# Patient Record
Sex: MALE | Race: WHITE | Employment: FULL TIME | URBAN - METROPOLITAN AREA
[De-identification: names, ages, dates, MRNs, and addresses within clinical notes are randomized per-mention and may not be internally consistent; named-entity substitution may affect disease eponyms.]

---

## 2023-01-25 ENCOUNTER — HOSPITAL ENCOUNTER (EMERGENCY)
Age: 23
Discharge: HOME | End: 2023-01-25
Attending: EMERGENCY MEDICINE
Payer: MEDICAID

## 2023-01-25 ENCOUNTER — APPOINTMENT (OUTPATIENT)
Dept: GENERAL RADIOLOGY | Age: 23
End: 2023-01-25
Payer: MEDICAID

## 2023-01-25 VITALS
WEIGHT: 140 LBS | OXYGEN SATURATION: 99 % | DIASTOLIC BLOOD PRESSURE: 69 MMHG | TEMPERATURE: 98.1 F | RESPIRATION RATE: 12 BRPM | HEIGHT: 67 IN | BODY MASS INDEX: 21.97 KG/M2 | HEART RATE: 81 BPM | SYSTOLIC BLOOD PRESSURE: 108 MMHG

## 2023-01-25 DIAGNOSIS — M62.838 SPASM OF MUSCLE: Primary | ICD-10-CM

## 2023-01-25 LAB
ABSOLUTE EOS #: 0.22 K/UL (ref 0–0.4)
ABSOLUTE LYMPH #: 2.58 K/UL (ref 1.2–5.2)
ABSOLUTE MONO #: 0.9 K/UL (ref 0.1–1.3)
ALBUMIN SERPL-MCNC: 5.2 G/DL (ref 3.5–5.2)
ALP BLD-CCNC: 92 U/L (ref 40–129)
ALT SERPL-CCNC: 16 U/L (ref 5–41)
ANION GAP SERPL CALCULATED.3IONS-SCNC: 9 MMOL/L (ref 9–17)
AST SERPL-CCNC: 17 U/L
BASOPHILS # BLD: 1 % (ref 0–2)
BASOPHILS ABSOLUTE: 0.11 K/UL (ref 0–0.2)
BILIRUB SERPL-MCNC: 0.7 MG/DL (ref 0.3–1.2)
BUN BLDV-MCNC: 17 MG/DL (ref 6–20)
CALCIUM SERPL-MCNC: 9.4 MG/DL (ref 8.6–10.4)
CHLORIDE BLD-SCNC: 100 MMOL/L (ref 98–107)
CO2: 28 MMOL/L (ref 20–31)
CREAT SERPL-MCNC: 1.03 MG/DL (ref 0.7–1.2)
EOSINOPHILS RELATIVE PERCENT: 2 % (ref 0–4)
GFR SERPL CREATININE-BSD FRML MDRD: >60 ML/MIN/1.73M2
GLUCOSE BLD-MCNC: 84 MG/DL (ref 70–99)
HCT VFR BLD CALC: 42.9 % (ref 41–53)
HEMOGLOBIN: 15.5 G/DL (ref 13.5–17.5)
LIPASE: 17 U/L (ref 13–60)
LYMPHOCYTES # BLD: 23 % (ref 25–45)
MCH RBC QN AUTO: 30.3 PG (ref 26–34)
MCHC RBC AUTO-ENTMCNC: 36.1 G/DL (ref 31–37)
MCV RBC AUTO: 84.1 FL (ref 80–100)
MONOCYTES # BLD: 8 % (ref 2–8)
MORPHOLOGY: ABNORMAL
PDW BLD-RTO: 12.5 % (ref 11.5–14.9)
PLATELET # BLD: 199 K/UL (ref 150–450)
PMV BLD AUTO: 7.9 FL (ref 6–12)
POTASSIUM SERPL-SCNC: 3.7 MMOL/L (ref 3.7–5.3)
RBC # BLD: 5.1 M/UL (ref 4.5–5.9)
SEG NEUTROPHILS: 66 % (ref 34–64)
SEGMENTED NEUTROPHILS ABSOLUTE COUNT: 7.39 K/UL (ref 1.3–9.1)
SODIUM BLD-SCNC: 137 MMOL/L (ref 135–144)
TOTAL PROTEIN: 7.7 G/DL (ref 6.4–8.3)
TROPONIN, HIGH SENSITIVITY: 8 NG/L (ref 0–22)
TROPONIN, HIGH SENSITIVITY: 8 NG/L (ref 0–22)
WBC # BLD: 11.2 K/UL (ref 4.5–13.5)

## 2023-01-25 PROCEDURE — 36415 COLL VENOUS BLD VENIPUNCTURE: CPT

## 2023-01-25 PROCEDURE — 84484 ASSAY OF TROPONIN QUANT: CPT

## 2023-01-25 PROCEDURE — 93005 ELECTROCARDIOGRAM TRACING: CPT | Performed by: STUDENT IN AN ORGANIZED HEALTH CARE EDUCATION/TRAINING PROGRAM

## 2023-01-25 PROCEDURE — 6360000002 HC RX W HCPCS: Performed by: STUDENT IN AN ORGANIZED HEALTH CARE EDUCATION/TRAINING PROGRAM

## 2023-01-25 PROCEDURE — 83690 ASSAY OF LIPASE: CPT

## 2023-01-25 PROCEDURE — 71045 X-RAY EXAM CHEST 1 VIEW: CPT

## 2023-01-25 PROCEDURE — 96372 THER/PROPH/DIAG INJ SC/IM: CPT

## 2023-01-25 PROCEDURE — 2580000003 HC RX 258: Performed by: STUDENT IN AN ORGANIZED HEALTH CARE EDUCATION/TRAINING PROGRAM

## 2023-01-25 PROCEDURE — 90715 TDAP VACCINE 7 YRS/> IM: CPT

## 2023-01-25 PROCEDURE — 85025 COMPLETE CBC W/AUTO DIFF WBC: CPT

## 2023-01-25 PROCEDURE — 6370000000 HC RX 637 (ALT 250 FOR IP)

## 2023-01-25 PROCEDURE — 90471 IMMUNIZATION ADMIN: CPT

## 2023-01-25 PROCEDURE — 6360000002 HC RX W HCPCS

## 2023-01-25 PROCEDURE — 80053 COMPREHEN METABOLIC PANEL: CPT

## 2023-01-25 PROCEDURE — 99285 EMERGENCY DEPT VISIT HI MDM: CPT

## 2023-01-25 RX ORDER — DOXYCYCLINE HYCLATE 100 MG
100 TABLET ORAL 2 TIMES DAILY
Qty: 14 TABLET | Refills: 0 | Status: SHIPPED | OUTPATIENT
Start: 2023-01-25 | End: 2023-02-01

## 2023-01-25 RX ORDER — DOXYCYCLINE 100 MG/1
100 CAPSULE ORAL ONCE
Status: COMPLETED | OUTPATIENT
Start: 2023-01-25 | End: 2023-01-25

## 2023-01-25 RX ORDER — 0.9 % SODIUM CHLORIDE 0.9 %
1000 INTRAVENOUS SOLUTION INTRAVENOUS ONCE
Status: COMPLETED | OUTPATIENT
Start: 2023-01-25 | End: 2023-01-25

## 2023-01-25 RX ORDER — DOXYCYCLINE 100 MG/1
100 CAPSULE ORAL ONCE
Status: DISCONTINUED | OUTPATIENT
Start: 2023-01-25 | End: 2023-01-25

## 2023-01-25 RX ORDER — DOXYCYCLINE 100 MG/1
CAPSULE ORAL
Status: COMPLETED
Start: 2023-01-25 | End: 2023-01-25

## 2023-01-25 RX ADMIN — TETANUS IMMUNE GLOBULIN (HUMAN) 250 UNITS: 250 INJECTION INTRAMUSCULAR at 18:34

## 2023-01-25 RX ADMIN — DOXYCYCLINE 100 MG: 100 CAPSULE ORAL at 17:43

## 2023-01-25 RX ADMIN — TETANUS IMMUNE GLOBULIN (HUMAN) 250 UNITS: 250 INJECTION INTRAMUSCULAR at 16:19

## 2023-01-25 RX ADMIN — TETANUS TOXOID, REDUCED DIPHTHERIA TOXOID AND ACELLULAR PERTUSSIS VACCINE, ADSORBED 0.5 ML: 5; 2.5; 8; 8; 2.5 SUSPENSION INTRAMUSCULAR at 16:25

## 2023-01-25 RX ADMIN — SODIUM CHLORIDE 1000 ML: 9 INJECTION, SOLUTION INTRAVENOUS at 16:15

## 2023-01-25 ASSESSMENT — HEART SCORE: ECG: 0

## 2023-01-25 ASSESSMENT — LIFESTYLE VARIABLES
HOW OFTEN DO YOU HAVE A DRINK CONTAINING ALCOHOL: MONTHLY OR LESS
HOW MANY STANDARD DRINKS CONTAINING ALCOHOL DO YOU HAVE ON A TYPICAL DAY: 1 OR 2

## 2023-01-25 ASSESSMENT — ENCOUNTER SYMPTOMS
VOMITING: 0
BACK PAIN: 0
DIARRHEA: 0
ABDOMINAL PAIN: 0
CONSTIPATION: 0
SHORTNESS OF BREATH: 1
NAUSEA: 0

## 2023-01-25 ASSESSMENT — PAIN - FUNCTIONAL ASSESSMENT
PAIN_FUNCTIONAL_ASSESSMENT: 0-10
PAIN_FUNCTIONAL_ASSESSMENT: NONE - DENIES PAIN

## 2023-01-25 ASSESSMENT — PAIN SCALES - GENERAL: PAINLEVEL_OUTOF10: 1

## 2023-01-25 NOTE — ED PROVIDER NOTES
[address]     Pt Name: Blair Pallas  MRN: [id number]  [place] 2000  Date of evaluation: 1/25/23       Blair Pallas is a 23 y.o. male who presents with Facial Pain (Pt to ED for right sided jaw pain- concerned for tetanus exposure/Pt states he was scratched by a \"loki nail\" to left upper ABD wall about 1 week ago/Pt conversing without difficulty, RR even and unlabored and handling secretions. )      MDM: 74-year-old male presents for reported facial pain, muscle spasms and reported exposure to a loki nail. He states that he was scraped by a loki nail at work approximately 8 days ago he states in the last day or so he felt like he had been having muscle spasms. He is states that he has not had a tetanus shot since he was a little    On initial exam patient in no acute distress, vitals are stable, on exam he has no trismus, no muscle rigidity noted, he is maintaining airway, no difficulty swallowing    Will check basic labs provide treatment    Patient was reevaluated reports feeling better    Patient/Guardian was informed of their diagnosis and told to follow up with PCP  in 1-3 days. Patient demonstrates understanding and agreement with the plan. They were given the opportunity to ask questions and those questions were answered to the best of our ability with the available information. Patient/Guardian told to return to the ED for any new, worsening, changing or persistent symptoms. This dictation was prepared using [email or web address] voice recognition software. Vitals:   Vitals:    01/25/23 1517   BP: 104/68   Pulse: 82   Resp: 16   Temp: 98.4 °F (36.9 °C)   TempSrc: Oral   SpO2: 99%   Weight: 140 lb (63.5 kg)   Height: 5' 7\" (1.702 m)         I personally saw and examined the patient. I have reviewed and agree with the resident's findings, including all diagnostic interpretations and treatment plan as written.  I was present for the key portions of any procedures performed and the inclusive time noted for any critical care statement. The care is provided during an unprecedented national emergency due to the novel coronavirus, COVID 19.   Dalton Meyers DO  Attending Emergency Physician           Dalton Meyers DO  01/26/23 1200

## 2023-01-25 NOTE — ED PROVIDER NOTES
[address] [facility]  Emergency Department Encounter  EmergencyMedicine Resident     Pt Name:Shabbir Parikh  MRN: [id number]  [place] 2000  Date of evaluation: 1/25/23  PCP:  No primary care provider on file. This patient was evaluated in the Emergency Department for symptoms described in the history of present illness. CHIEF COMPLAINT       Chief Complaint   Patient presents with    Facial Pain     Pt to ED for right sided jaw pain- concerned for tetanus exposure  Pt states he was scratched by a \"loki nail\" to left upper ABD wall about 1 week ago  Pt conversing without difficulty, RR even and unlabored and handling secretions. HISTORY OF PRESENT ILLNESS  (Location/Symptom, Timing/Onset, Context/Setting, Quality, Duration, Modifying Factors, Severity.)      Melvin Adkins is a 23 y.o. male who presents with abdominal pain, abdominal muscle spasms, left lower extremity muscle spasms, neck pain, jaw pain, headaches, nausea/vomiting. Patient states that he has been experiencing these symptoms for the past 8 days. States that 8 days ago he was in a crawl space for work when he had a scratch on his abdomen from a loki nail. Patient does not remember the last time he was immunized. States the last time he saw a doctor was at the age of 17 and does not remember receiving any shots at that time. States that he has a history of migraines and his headaches feel like his normal migraines. Denies migraine at this time. Neck pain is posterior but patient has full range of motion in neck without discomfort. Patient endorsing jaw pain but is able to open his jaw completely. No change in voice, no difficulty breathing, no difficulty swallowing. Able to tolerate p.o. but patient is endorsing nausea and vomiting. Patient also endorsing muscle spasms across his abdomen and left lower extremity. Wound is on the left abdomen. Denies fever/chills.   Patient currently denying all pain and symptoms in the emergency department at this time except for neck discomfort. PAST MEDICAL / SURGICAL / SOCIAL / FAMILY HISTORY      has no past medical history on file. Denies     has no past surgical history on file. Denies    Social History     Socioeconomic History    Marital status: Single     Spouse name: Not on file    Number of children: Not on file    Years of education: Not on file    Highest education level: Not on file   Occupational History    Not on file   Tobacco Use    Smoking status: Not on file    Smokeless tobacco: Not on file   Substance and Sexual Activity    Alcohol use: Not on file    Drug use: Not on file    Sexual activity: Not on file   Other Topics Concern    Not on file   Social History Narrative    Not on file     Social Determinants of Health     Financial Resource Strain: Not on file   Food Insecurity: Not on file   Transportation Needs: Not on file   Physical Activity: Not on file   Stress: Not on file   Social Connections: Not on file   Intimate Partner Violence: Not on file   Housing Stability: Not on file       No family history on file. Allergies:    Patient has no known allergies. Home Medications:  Prior to Admission medications    Medication Sig Start Date End Date Taking? Authorizing Provider   doxycycline hyclate (VIBRA-TABS) 100 MG tablet Take 1 tablet by mouth 2 times daily for 7 days 1/25/23 2/1/23 Yes Elder Berry, DO       REVIEW OF SYSTEMS    (2-9 systems for level 4, 10 or more for level 5)    Review of Systems   Constitutional:  Negative for chills and fever. HENT:  Negative for congestion. Eyes:  Negative for visual disturbance. Respiratory:  Positive for shortness of breath. Cardiovascular:  Positive for chest pain. Gastrointestinal:  Negative for abdominal pain, constipation, diarrhea, nausea and vomiting. Genitourinary:  Negative for difficulty urinating and dysuria. Musculoskeletal:  Negative for back pain. Skin:  Negative for wound. Neurological:  Negative for weakness, numbness and headaches. PHYSICAL EXAM   (up to 7 for level 4, 8 or more for level 5)    INITIAL VITALS:   /69   Pulse 81   Temp 98.1 °F (36.7 °C)   Resp 12   Ht 5' 7\" (1.702 m)   Wt 140 lb (63.5 kg)   SpO2 99%   BMI 21.93 kg/m²   I have reviewed the triage vital signs. Const: Well nourished, well developed, appears stated age, no acute distress, nontoxic  Eyes: PERRL, EOMI, no conjunctival injection  HENT: NCAT, Neck supple without meningismus. No trismus  CV: RRR, Warm, well-perfused extremities  RESP: CTAB, Unlabored respiratory effort  GI: soft, non-tender, non-distended, no masses. MSK: No gross deformities appreciated  Skin: Warm, dry. No rashes. Well-healing abrasion on the left upper quadrant of the abdomen. No discharge or bleeding. No erythema. Neuro: Alert and oriented x4, GCS 15, CNs II-XII grossly intact. Sensation and motor function of extremities grossly intact. Psych: Appropriate mood and affect. DIFFERENTIAL  DIAGNOSIS   DDX:  Tetanus, infection, ACS/MI, pneumonia, pneumothorax, pancreatitis, hepatitis, electrolyte abnormality    Initial MDM:  22-year-old male presents emergency department with abdominal pain, abdominal muscle spasms, left lower extremity muscle spasms, neck pain, jaw pain, headaches, nauseous vomiting. Patient noticed this after he obtained a scratch on his abdomen from a loki nail 8 days ago. Upon evaluation in the emergency department patient is denying any active symptoms. Afebrile and vital signs stable. Patient in no acute distress, nontoxic. Concern for tetanus, will give immunization as well as immunoglobulin. Will give fluids. Will get lab work. Will get EKG. Will reevaluate.     PLAN (LABS / IMAGING / EKG):  Orders Placed This Encounter   Procedures    XR CHEST PORTABLE    CBC with Auto Differential    Comprehensive Metabolic Panel w/ Reflex to MG    Lipase    Troponin    EKG 12 Lead MEDICATIONS ORDERED:  Orders Placed This Encounter   Medications    Tetanus-Diphth-Acell Pertussis (BOOSTRIX) injection 0.5 mL    tetanus immune globulin (HYPERTET) injection 250 Units    0.9 % sodium chloride bolus    tetanus-diphth-acell pertussis (BOOSTRIX) 5-2.5-18.5 LF-MCG/0.5 injection     Rozena Lapping: cabinet override    DISCONTD: tetanus immune globulin (HYPERTET) injection 250 Units    DISCONTD: doxycycline monohydrate (MONODOX) capsule 100 mg     Order Specific Question:   Antimicrobial Indications     Answer: Other     Order Specific Question:   Other Abx Indication     Answer:   tetanus    doxycycline monohydrate (MONODOX) capsule 100 mg     Order Specific Question:   Antimicrobial Indications     Answer:    Other     Order Specific Question:   Other Abx Indication     Answer:   tetanus     Order Specific Question:   Suspected Organism(s)     Answer:   tetanus    doxycycline monohydrate (MONODOX) 100 MG capsule     Mary Acharya: cabinet override    DISCONTD: tetanus immune globulin (HYPERTET) injection 250 Units    tetanus immune globulin (HYPERTET) injection 250 Units    doxycycline hyclate (VIBRA-TABS) 100 MG tablet     Sig: Take 1 tablet by mouth 2 times daily for 7 days     Dispense:  14 tablet     Refill:  0         DIAGNOSTIC RESULTS / EMERGENCY DEPARTMENT COURSE / MDM   LAB RESULTS:  Results for orders placed or performed during the hospital encounter of 01/25/23   CBC with Auto Differential   Result Value Ref Range    WBC 11.2 4.5 - 13.5 k/uL    RBC 5.10 4.5 - 5.9 m/uL    Hemoglobin 15.5 13.5 - 17.5 g/dL    Hematocrit 42.9 41 - 53 %    MCV 84.1 80 - 100 fL    MCH 30.3 26 - 34 pg    MCHC 36.1 31 - 37 g/dL    RDW 12.5 11.5 - 14.9 %    Platelets 237 551 - 921 k/uL    MPV 7.9 6.0 - 12.0 fL    Seg Neutrophils 66 (H) 34 - 64 %    Lymphocytes 23 (L) 25 - 45 %    Monocytes 8 2 - 8 %    Eosinophils % 2 0 - 4 %    Basophils 1 0 - 2 %    Segs Absolute 7.39 1.3 - 9.1 k/uL    Absolute Lymph # 2.58 1.2 - 5.2 k/uL    Absolute Mono # 0.90 0.1 - 1.3 k/uL    Absolute Eos # 0.22 0.0 - 0.4 k/uL    Basophils Absolute 0.11 0.0 - 0.2 k/uL    Morphology ANISOCYTOSIS PRESENT     Morphology 1+ TEARDROPS     Morphology 1+ ELLIPTOCYTES    Comprehensive Metabolic Panel w/ Reflex to MG   Result Value Ref Range    Glucose 84 70 - 99 mg/dL    BUN 17 6 - 20 mg/dL    Creatinine 1.03 0.70 - 1.20 mg/dL    Est, Glom Filt Rate >60 >60 mL/min/1.73m2    Calcium 9.4 8.6 - 10.4 mg/dL    Sodium 137 135 - 144 mmol/L    Potassium 3.7 3.7 - 5.3 mmol/L    Chloride 100 98 - 107 mmol/L    CO2 28 20 - 31 mmol/L    Anion Gap 9 9 - 17 mmol/L    Alkaline Phosphatase 92 40 - 129 U/L    ALT 16 5 - 41 U/L    AST 17 <40 U/L    Total Bilirubin 0.7 0.3 - 1.2 mg/dL    Total Protein 7.7 6.4 - 8.3 g/dL    Albumin 5.2 3.5 - 5.2 g/dL   Troponin   Result Value Ref Range    Troponin, High Sensitivity 8 0 - 22 ng/L   Lipase   Result Value Ref Range    Lipase 17 13 - 60 U/L   Troponin   Result Value Ref Range    Troponin, High Sensitivity 8 0 - 22 ng/L   EKG 12 Lead   Result Value Ref Range    Ventricular Rate 69 BPM    Atrial Rate 69 BPM    P-R Interval 174 ms    QRS Duration 90 ms    Q-T Interval 368 ms    QTc Calculation (Bazett) 394 ms    P Axis 64 degrees    R Axis 73 degrees    T Axis 66 degrees       Lab work is unremarkable    RADIOLOGY:  XR CHEST PORTABLE    Result Date: 1/25/2023  EXAMINATION: ONE XRAY VIEW OF THE CHEST 1/25/2023 4:05 pm COMPARISON: None. HISTORY: ORDERING SYSTEM PROVIDED HISTORY: chest pain, SOB TECHNOLOGIST PROVIDED HISTORY: chest pain, SOB Reason for Exam: chest pain, SOB FINDINGS: Cardiomediastinal silhouette and pulmonary vasculature are within normal limits. No focal airspace consolidation, pneumothorax, or pleural effusion. No free air beneath the diaphragm. No acute osseous abnormality. No acute intrathoracic process.         EKG  Rhythm: normal sinus   Rate: normal  Axis: normal  Ectopy: none  Conduction: normal  ST Segments: no acute change  T Waves: no acute change  Q Waves: none    EKG  Impression: non-specific EKG     All EKG's are interpreted by the Emergency Department Physician who either signs or Co-signs this chart in the absence of a cardiologist.    Heart score  History: 0  EC  Patient Age: 2  Risk Factors: 0  Troponin: 0  Heart Score Total: 0    [facility]/EMERGENCY DEPARTMENT COURSE:  Upon reevaluation patient resting comfortably, no acute distress, no symptoms at this time. Given prescription for doxycycline. Encourage patient to follow-up with primary care physician. Given return precautions. Patient discharged home. Patient understands and agrees with plan.       CRITICAL CARE:  Please see Attending Note    FINAL IMPRESSION      1. Spasm of muscle          DISPOSITION / PLAN     DISPOSITION Decision To Discharge 2023 07:16:07 PM    PATIENT REFERRED TO:  [facility] ED  [address] [address]  [phone]  Go to   If symptoms worsen    DISCHARGE MEDICATIONS:  Discharge Medication List as of 2023  7:27 PM        START taking these medications    Details   doxycycline hyclate (VIBRA-TABS) 100 MG tablet Take 1 tablet by mouth 2 times daily for 7 days, Disp-14 tablet, R-0Print             Ana Rosa Farrar DO  Emergency Medicine Resident, PGY 2    (Please note that portions of this note were completed with a voice recognition program.  Efforts were made to edit the dictations but occasionally words are mis-transcribed.)       Ana Rosa Farrar DO  Resident  [phone]

## 2023-01-26 LAB
EKG ATRIAL RATE: 69 BPM
EKG P AXIS: 64 DEGREES
EKG P-R INTERVAL: 174 MS
EKG Q-T INTERVAL: 368 MS
EKG QRS DURATION: 90 MS
EKG QTC CALCULATION (BAZETT): 394 MS
EKG R AXIS: 73 DEGREES
EKG T AXIS: 66 DEGREES
EKG VENTRICULAR RATE: 69 BPM

## 2023-01-26 NOTE — DISCHARGE INSTRUCTIONS
You were evaluated in the emergency department for muscle spasms and concern for tetanus. You were given 2 different medications to help combat tetanus. There is no way to know if this is truly tetanus but you were given the treatments for it. You were also sent home with an antibiotic. Please take this medication as prescribed. Please follow-up with your primary care physician as soon as possible. Please return to the emergency department for any worsening symptoms including but not limited to chest pain, shortness of breath, dizziness, inability to stand or walk, inability eat or drink, change in vision or hearing, worsening muscle spasms, blood in your stool, blood in your urine, inability to open your mouth, or any other questions or concerns.